# Patient Record
Sex: FEMALE | URBAN - METROPOLITAN AREA
[De-identification: names, ages, dates, MRNs, and addresses within clinical notes are randomized per-mention and may not be internally consistent; named-entity substitution may affect disease eponyms.]

---

## 2017-01-31 ENCOUNTER — IMPORTED ENCOUNTER (OUTPATIENT)
Dept: URBAN - METROPOLITAN AREA CLINIC 1 | Facility: CLINIC | Age: 57
End: 2017-01-31

## 2017-01-31 PROBLEM — H52.13: Noted: 2017-01-31

## 2017-01-31 PROCEDURE — S0620 ROUTINE OPHTHALMOLOGICAL EXA: HCPCS

## 2017-01-31 NOTE — PATIENT DISCUSSION
1. Myopia: Rx was given for correction if indicated and requested. **Consider Monovision first for CTL Fit or MF. OD Dominant**2. Cataract OU - Observe 3. Corneal Scar OS Return for an appointment in PRN CTL Fit with Dr. Ary Camarillo. Return for an appointment in 1 year for 40. with Dr. Ary Camarillo.

## 2022-04-03 ASSESSMENT — KERATOMETRY
OS_K2POWER_DIOPTERS: 41.75
OS_AXISANGLE2_DEGREES: 081
OD_K1POWER_DIOPTERS: 41.50
OS_AXISANGLE_DEGREES: 171
OD_AXISANGLE2_DEGREES: 102
OD_AXISANGLE_DEGREES: 012
OS_K1POWER_DIOPTERS: 41.25
OD_K2POWER_DIOPTERS: 42.25

## 2022-04-03 ASSESSMENT — TONOMETRY
OS_IOP_MMHG: 16
OD_IOP_MMHG: 16

## 2022-04-03 ASSESSMENT — VISUAL ACUITY
OS_CC: J1+
OD_CC: J1+
OD_SC: 20/20
OS_SC: 20/20